# Patient Record
Sex: FEMALE | Race: OTHER | HISPANIC OR LATINO
[De-identification: names, ages, dates, MRNs, and addresses within clinical notes are randomized per-mention and may not be internally consistent; named-entity substitution may affect disease eponyms.]

---

## 2022-11-16 ENCOUNTER — NON-APPOINTMENT (OUTPATIENT)
Age: 42
End: 2022-11-16

## 2022-11-30 ENCOUNTER — APPOINTMENT (OUTPATIENT)
Dept: ANTEPARTUM | Facility: CLINIC | Age: 42
End: 2022-11-30

## 2022-11-30 ENCOUNTER — ASOB RESULT (OUTPATIENT)
Age: 42
End: 2022-11-30

## 2022-11-30 PROCEDURE — 76811 OB US DETAILED SNGL FETUS: CPT

## 2022-11-30 PROCEDURE — 76817 TRANSVAGINAL US OBSTETRIC: CPT

## 2022-12-12 ENCOUNTER — APPOINTMENT (OUTPATIENT)
Dept: ANTEPARTUM | Facility: CLINIC | Age: 42
End: 2022-12-12

## 2022-12-12 ENCOUNTER — ASOB RESULT (OUTPATIENT)
Age: 42
End: 2022-12-12

## 2022-12-12 PROCEDURE — 76946 ECHO GUIDE FOR AMNIOCENTESIS: CPT

## 2022-12-12 PROCEDURE — 59000 AMNIOCENTESIS DIAGNOSTIC: CPT

## 2022-12-29 ENCOUNTER — ASOB RESULT (OUTPATIENT)
Age: 42
End: 2022-12-29

## 2022-12-29 ENCOUNTER — APPOINTMENT (OUTPATIENT)
Dept: ANTEPARTUM | Facility: CLINIC | Age: 42
End: 2022-12-29
Payer: COMMERCIAL

## 2022-12-29 PROCEDURE — 76817 TRANSVAGINAL US OBSTETRIC: CPT | Mod: 59

## 2022-12-29 PROCEDURE — 76816 OB US FOLLOW-UP PER FETUS: CPT

## 2023-01-13 ENCOUNTER — APPOINTMENT (OUTPATIENT)
Dept: ANTEPARTUM | Facility: CLINIC | Age: 43
End: 2023-01-13
Payer: COMMERCIAL

## 2023-01-13 ENCOUNTER — ASOB RESULT (OUTPATIENT)
Age: 43
End: 2023-01-13

## 2023-01-13 PROCEDURE — 76816 OB US FOLLOW-UP PER FETUS: CPT

## 2023-02-10 ENCOUNTER — ASOB RESULT (OUTPATIENT)
Age: 43
End: 2023-02-10

## 2023-02-10 ENCOUNTER — APPOINTMENT (OUTPATIENT)
Dept: ANTEPARTUM | Facility: CLINIC | Age: 43
End: 2023-02-10
Payer: COMMERCIAL

## 2023-02-10 PROCEDURE — 76816 OB US FOLLOW-UP PER FETUS: CPT | Mod: 59

## 2023-02-10 PROCEDURE — 76819 FETAL BIOPHYS PROFIL W/O NST: CPT

## 2023-03-10 ENCOUNTER — ASOB RESULT (OUTPATIENT)
Age: 43
End: 2023-03-10

## 2023-03-10 ENCOUNTER — APPOINTMENT (OUTPATIENT)
Dept: ANTEPARTUM | Facility: CLINIC | Age: 43
End: 2023-03-10
Payer: COMMERCIAL

## 2023-03-10 PROCEDURE — 76816 OB US FOLLOW-UP PER FETUS: CPT

## 2023-03-10 PROCEDURE — 76819 FETAL BIOPHYS PROFIL W/O NST: CPT

## 2023-04-07 ENCOUNTER — APPOINTMENT (OUTPATIENT)
Dept: ANTEPARTUM | Facility: CLINIC | Age: 43
End: 2023-04-07
Payer: COMMERCIAL

## 2023-04-07 ENCOUNTER — ASOB RESULT (OUTPATIENT)
Age: 43
End: 2023-04-07

## 2023-04-07 PROCEDURE — 76818 FETAL BIOPHYS PROFILE W/NST: CPT

## 2023-04-07 PROCEDURE — 76816 OB US FOLLOW-UP PER FETUS: CPT

## 2023-04-13 ENCOUNTER — ASOB RESULT (OUTPATIENT)
Age: 43
End: 2023-04-13

## 2023-04-13 ENCOUNTER — APPOINTMENT (OUTPATIENT)
Dept: ANTEPARTUM | Facility: CLINIC | Age: 43
End: 2023-04-13
Payer: COMMERCIAL

## 2023-04-13 PROCEDURE — 76818 FETAL BIOPHYS PROFILE W/NST: CPT

## 2023-04-20 ENCOUNTER — APPOINTMENT (OUTPATIENT)
Dept: ANTEPARTUM | Facility: CLINIC | Age: 43
End: 2023-04-20
Payer: COMMERCIAL

## 2023-04-20 ENCOUNTER — ASOB RESULT (OUTPATIENT)
Age: 43
End: 2023-04-20

## 2023-04-20 PROCEDURE — 76818 FETAL BIOPHYS PROFILE W/NST: CPT

## 2023-04-20 PROCEDURE — 76816 OB US FOLLOW-UP PER FETUS: CPT

## 2023-04-27 ENCOUNTER — ASOB RESULT (OUTPATIENT)
Age: 43
End: 2023-04-27

## 2023-04-27 ENCOUNTER — APPOINTMENT (OUTPATIENT)
Dept: ANTEPARTUM | Facility: CLINIC | Age: 43
End: 2023-04-27
Payer: COMMERCIAL

## 2023-04-27 PROCEDURE — 76818 FETAL BIOPHYS PROFILE W/NST: CPT

## 2023-05-02 ENCOUNTER — OUTPATIENT (OUTPATIENT)
Dept: OUTPATIENT SERVICES | Facility: HOSPITAL | Age: 43
LOS: 1 days | End: 2023-05-02
Payer: COMMERCIAL

## 2023-05-02 ENCOUNTER — ASOB RESULT (OUTPATIENT)
Age: 43
End: 2023-05-02

## 2023-05-02 ENCOUNTER — APPOINTMENT (OUTPATIENT)
Dept: ANTEPARTUM | Facility: CLINIC | Age: 43
End: 2023-05-02
Payer: COMMERCIAL

## 2023-05-02 DIAGNOSIS — Z01.818 ENCOUNTER FOR OTHER PREPROCEDURAL EXAMINATION: ICD-10-CM

## 2023-05-02 LAB
APTT BLD: 26.7 SEC — LOW (ref 27.5–35.5)
BLD GP AB SCN SERPL QL: NEGATIVE — SIGNIFICANT CHANGE UP
BLD GP AB SCN SERPL QL: NEGATIVE — SIGNIFICANT CHANGE UP
HCT VFR BLD CALC: 41.4 % — SIGNIFICANT CHANGE UP (ref 34.5–45)
HGB BLD-MCNC: 13.8 G/DL — SIGNIFICANT CHANGE UP (ref 11.5–15.5)
INR BLD: 0.91 — SIGNIFICANT CHANGE UP (ref 0.88–1.16)
MCHC RBC-ENTMCNC: 32.4 PG — SIGNIFICANT CHANGE UP (ref 27–34)
MCHC RBC-ENTMCNC: 33.3 GM/DL — SIGNIFICANT CHANGE UP (ref 32–36)
MCV RBC AUTO: 97.2 FL — SIGNIFICANT CHANGE UP (ref 80–100)
NRBC # BLD: 0 /100 WBCS — SIGNIFICANT CHANGE UP (ref 0–0)
PLATELET # BLD AUTO: 252 K/UL — SIGNIFICANT CHANGE UP (ref 150–400)
PROTHROM AB SERPL-ACNC: 10.8 SEC — SIGNIFICANT CHANGE UP (ref 10.5–13.4)
RBC # BLD: 4.26 M/UL — SIGNIFICANT CHANGE UP (ref 3.8–5.2)
RBC # FLD: 13.6 % — SIGNIFICANT CHANGE UP (ref 10.3–14.5)
RH IG SCN BLD-IMP: POSITIVE — SIGNIFICANT CHANGE UP
RH IG SCN BLD-IMP: POSITIVE — SIGNIFICANT CHANGE UP
WBC # BLD: 9.37 K/UL — SIGNIFICANT CHANGE UP (ref 3.8–10.5)
WBC # FLD AUTO: 9.37 K/UL — SIGNIFICANT CHANGE UP (ref 3.8–10.5)

## 2023-05-02 PROCEDURE — 85027 COMPLETE CBC AUTOMATED: CPT

## 2023-05-02 PROCEDURE — 86850 RBC ANTIBODY SCREEN: CPT

## 2023-05-02 PROCEDURE — 85610 PROTHROMBIN TIME: CPT

## 2023-05-02 PROCEDURE — 86780 TREPONEMA PALLIDUM: CPT

## 2023-05-02 PROCEDURE — 76816 OB US FOLLOW-UP PER FETUS: CPT

## 2023-05-02 PROCEDURE — 86901 BLOOD TYPING SEROLOGIC RH(D): CPT

## 2023-05-02 PROCEDURE — 85730 THROMBOPLASTIN TIME PARTIAL: CPT

## 2023-05-02 PROCEDURE — 86900 BLOOD TYPING SEROLOGIC ABO: CPT

## 2023-05-02 PROCEDURE — 76818 FETAL BIOPHYS PROFILE W/NST: CPT

## 2023-05-03 ENCOUNTER — TRANSCRIPTION ENCOUNTER (OUTPATIENT)
Age: 43
End: 2023-05-03

## 2023-05-03 LAB — T PALLIDUM AB TITR SER: NEGATIVE — SIGNIFICANT CHANGE UP

## 2023-05-04 ENCOUNTER — INPATIENT (INPATIENT)
Facility: HOSPITAL | Age: 43
LOS: 2 days | Discharge: HOME | End: 2023-05-07
Attending: OBSTETRICS & GYNECOLOGY | Admitting: OBSTETRICS & GYNECOLOGY
Payer: COMMERCIAL

## 2023-05-04 VITALS — HEIGHT: 67 IN | WEIGHT: 164.91 LBS

## 2023-05-04 LAB
BLD GP AB SCN SERPL QL: NEGATIVE — SIGNIFICANT CHANGE UP
RH IG SCN BLD-IMP: POSITIVE — SIGNIFICANT CHANGE UP
RUBV IGG SER-ACNC: 8.1 INDEX — SIGNIFICANT CHANGE UP
RUBV IGG SER-IMP: POSITIVE — SIGNIFICANT CHANGE UP

## 2023-05-04 PROCEDURE — 88307 TISSUE EXAM BY PATHOLOGIST: CPT | Mod: 26

## 2023-05-04 PROCEDURE — 37244 VASC EMBOLIZE/OCCLUDE BLEED: CPT

## 2023-05-04 PROCEDURE — 36247 INS CATH ABD/L-EXT ART 3RD: CPT | Mod: 59

## 2023-05-04 DEVICE — ARISTA 3GR: Type: IMPLANTABLE DEVICE | Status: FUNCTIONAL

## 2023-05-04 DEVICE — SEPRAFILM 5 X 6": Type: IMPLANTABLE DEVICE | Status: FUNCTIONAL

## 2023-05-04 RX ORDER — SIMETHICONE 80 MG/1
80 TABLET, CHEWABLE ORAL EVERY 4 HOURS
Refills: 0 | Status: DISCONTINUED | OUTPATIENT
Start: 2023-05-04 | End: 2023-05-07

## 2023-05-04 RX ORDER — SODIUM CHLORIDE 9 MG/ML
1000 INJECTION, SOLUTION INTRAVENOUS
Refills: 0 | Status: DISCONTINUED | OUTPATIENT
Start: 2023-05-04 | End: 2023-05-04

## 2023-05-04 RX ORDER — CEFAZOLIN SODIUM 1 G
2000 VIAL (EA) INJECTION EVERY 8 HOURS
Refills: 0 | Status: COMPLETED | OUTPATIENT
Start: 2023-05-04 | End: 2023-05-05

## 2023-05-04 RX ORDER — LANOLIN
1 OINTMENT (GRAM) TOPICAL EVERY 6 HOURS
Refills: 0 | Status: DISCONTINUED | OUTPATIENT
Start: 2023-05-04 | End: 2023-05-07

## 2023-05-04 RX ORDER — SODIUM CHLORIDE 9 MG/ML
1000 INJECTION, SOLUTION INTRAVENOUS ONCE
Refills: 0 | Status: COMPLETED | OUTPATIENT
Start: 2023-05-04 | End: 2023-05-04

## 2023-05-04 RX ORDER — FAMOTIDINE 10 MG/ML
20 INJECTION INTRAVENOUS ONCE
Refills: 0 | Status: COMPLETED | OUTPATIENT
Start: 2023-05-04 | End: 2023-05-04

## 2023-05-04 RX ORDER — OXYTOCIN 10 UNIT/ML
333.33 VIAL (ML) INJECTION
Qty: 20 | Refills: 0 | Status: DISCONTINUED | OUTPATIENT
Start: 2023-05-04 | End: 2023-05-04

## 2023-05-04 RX ORDER — CITRIC ACID/SODIUM CITRATE 300-500 MG
30 SOLUTION, ORAL ORAL ONCE
Refills: 0 | Status: DISCONTINUED | OUTPATIENT
Start: 2023-05-04 | End: 2023-05-04

## 2023-05-04 RX ORDER — KETOROLAC TROMETHAMINE 30 MG/ML
30 SYRINGE (ML) INJECTION EVERY 6 HOURS
Refills: 0 | Status: DISCONTINUED | OUTPATIENT
Start: 2023-05-04 | End: 2023-05-05

## 2023-05-04 RX ORDER — OXYCODONE HYDROCHLORIDE 5 MG/1
5 TABLET ORAL
Refills: 0 | Status: DISCONTINUED | OUTPATIENT
Start: 2023-05-04 | End: 2023-05-07

## 2023-05-04 RX ORDER — DIPHENHYDRAMINE HCL 50 MG
25 CAPSULE ORAL EVERY 6 HOURS
Refills: 0 | Status: DISCONTINUED | OUTPATIENT
Start: 2023-05-04 | End: 2023-05-07

## 2023-05-04 RX ORDER — ACETAMINOPHEN 500 MG
975 TABLET ORAL
Refills: 0 | Status: DISCONTINUED | OUTPATIENT
Start: 2023-05-04 | End: 2023-05-07

## 2023-05-04 RX ORDER — CEFAZOLIN SODIUM 1 G
2000 VIAL (EA) INJECTION ONCE
Refills: 0 | Status: COMPLETED | OUTPATIENT
Start: 2023-05-04 | End: 2023-05-04

## 2023-05-04 RX ORDER — IBUPROFEN 200 MG
600 TABLET ORAL EVERY 6 HOURS
Refills: 0 | Status: COMPLETED | OUTPATIENT
Start: 2023-05-04 | End: 2024-04-01

## 2023-05-04 RX ORDER — TETANUS TOXOID, REDUCED DIPHTHERIA TOXOID AND ACELLULAR PERTUSSIS VACCINE, ADSORBED 5; 2.5; 8; 8; 2.5 [IU]/.5ML; [IU]/.5ML; UG/.5ML; UG/.5ML; UG/.5ML
0.5 SUSPENSION INTRAMUSCULAR ONCE
Refills: 0 | Status: DISCONTINUED | OUTPATIENT
Start: 2023-05-04 | End: 2023-05-07

## 2023-05-04 RX ORDER — OXYTOCIN 10 UNIT/ML
333.33 VIAL (ML) INJECTION
Qty: 20 | Refills: 0 | Status: DISCONTINUED | OUTPATIENT
Start: 2023-05-04 | End: 2023-05-07

## 2023-05-04 RX ORDER — SODIUM CHLORIDE 9 MG/ML
1000 INJECTION, SOLUTION INTRAVENOUS
Refills: 0 | Status: DISCONTINUED | OUTPATIENT
Start: 2023-05-04 | End: 2023-05-07

## 2023-05-04 RX ORDER — DIPHENOXYLATE HCL/ATROPINE 2.5-.025MG
1 TABLET ORAL ONCE
Refills: 0 | Status: DISCONTINUED | OUTPATIENT
Start: 2023-05-04 | End: 2023-05-04

## 2023-05-04 RX ORDER — MAGNESIUM HYDROXIDE 400 MG/1
30 TABLET, CHEWABLE ORAL
Refills: 0 | Status: DISCONTINUED | OUTPATIENT
Start: 2023-05-04 | End: 2023-05-07

## 2023-05-04 RX ORDER — OXYCODONE HYDROCHLORIDE 5 MG/1
5 TABLET ORAL ONCE
Refills: 0 | Status: DISCONTINUED | OUTPATIENT
Start: 2023-05-04 | End: 2023-05-07

## 2023-05-04 RX ADMIN — Medication 0.2 MILLIGRAM(S): at 18:29

## 2023-05-04 RX ADMIN — Medication 975 MILLIGRAM(S): at 22:00

## 2023-05-04 RX ADMIN — SODIUM CHLORIDE 125 MILLILITER(S): 9 INJECTION, SOLUTION INTRAVENOUS at 09:30

## 2023-05-04 RX ADMIN — Medication 100 MILLIGRAM(S): at 19:04

## 2023-05-04 RX ADMIN — Medication 30 MILLIGRAM(S): at 19:04

## 2023-05-04 RX ADMIN — SODIUM CHLORIDE 2000 MILLILITER(S): 9 INJECTION, SOLUTION INTRAVENOUS at 10:36

## 2023-05-04 RX ADMIN — Medication 30 MILLIGRAM(S): at 20:08

## 2023-05-04 RX ADMIN — Medication 100 MILLIGRAM(S): at 11:30

## 2023-05-04 RX ADMIN — Medication 975 MILLIGRAM(S): at 22:30

## 2023-05-04 RX ADMIN — FAMOTIDINE 20 MILLIGRAM(S): 10 INJECTION INTRAVENOUS at 10:36

## 2023-05-04 NOTE — PRE-ANESTHESIA EVALUATION ADULT - NSANTHPMHFT_GEN_ALL_CORE
PMHx; denies    PSxHx myomectomy GA no complication    OB Hx  with IUP 39 weeks, Breech Presentation, +HPV

## 2023-05-04 NOTE — PRE-ANESTHESIA EVALUATION ADULT - NSANTHOSAYNRD_GEN_A_CORE
No. SHEYLA screening performed.  STOP BANG Legend: 0-2 = LOW Risk; 3-4 = INTERMEDIATE Risk; 5-8 = HIGH Risk

## 2023-05-04 NOTE — CONSULT NOTE ADULT - SUBJECTIVE AND OBJECTIVE BOX
42F presented to [facility] for elective c section for breech presentation c/b placenta accreta, EBV 1500 now s/p 2u PRCB. IR consulted for uterine artery embolization.     Clinical History: O32.1XX0    Handoff    SysAdmin_VstLnk        Meds:acetaminophen     Tablet .. 975 milliGRAM(s) Oral <User Schedule>  diphenhydrAMINE 25 milliGRAM(s) Oral every 6 hours PRN  diphtheria/tetanus/pertussis (acellular) Vaccine (Adacel) 0.5 milliLiter(s) IntraMuscular once  ibuprofen  Tablet. 600 milliGRAM(s) Oral every 6 hours  ketorolac   Injectable 30 milliGRAM(s) IV Push every 6 hours  lactated ringers. 1000 milliLiter(s) IV Continuous <Continuous>  lanolin Ointment 1 Application(s) Topical every 6 hours PRN  magnesium hydroxide Suspension 30 milliLiter(s) Oral two times a day PRN  oxyCODONE    IR 5 milliGRAM(s) Oral every 3 hours PRN  oxyCODONE    IR 5 milliGRAM(s) Oral once PRN  oxytocin Infusion 333.333 milliUNIT(s)/Min IV Continuous <Continuous>  simethicone 80 milliGRAM(s) Chew every 4 hours PRN      Allergies:No Known Allergies        Labs:

## 2023-05-04 NOTE — CONSULT NOTE ADULT - ASSESSMENT
Assessment: 42F presented to [facility] for elective c section for breech presentation c/b placenta accreta, EBV 1500 now s/p 2u PRCB. IR consulted for uterine artery embolization. Case reviewed with Dr. Burger, plan for procedure with anesthesia.     Recommendations: Maintain NPO x 8 hours prior to procedure.     Communicated with: Dr. King, Dr. Ogden

## 2023-05-05 LAB
ANISOCYTOSIS BLD QL: SLIGHT — SIGNIFICANT CHANGE UP
BASOPHILS # BLD AUTO: 0 K/UL — SIGNIFICANT CHANGE UP (ref 0–0.2)
BASOPHILS NFR BLD AUTO: 0 % — SIGNIFICANT CHANGE UP (ref 0–2)
EOSINOPHIL # BLD AUTO: 0 K/UL — SIGNIFICANT CHANGE UP (ref 0–0.5)
EOSINOPHIL NFR BLD AUTO: 0 % — SIGNIFICANT CHANGE UP (ref 0–6)
GIANT PLATELETS BLD QL SMEAR: PRESENT — SIGNIFICANT CHANGE UP
HCT VFR BLD CALC: 39.6 % — SIGNIFICANT CHANGE UP (ref 34.5–45)
HGB BLD-MCNC: 13.5 G/DL — SIGNIFICANT CHANGE UP (ref 11.5–15.5)
LYMPHOCYTES # BLD AUTO: 1.71 K/UL — SIGNIFICANT CHANGE UP (ref 1–3.3)
LYMPHOCYTES # BLD AUTO: 11.3 % — LOW (ref 13–44)
MANUAL SMEAR VERIFICATION: SIGNIFICANT CHANGE UP
MCHC RBC-ENTMCNC: 32 PG — SIGNIFICANT CHANGE UP (ref 27–34)
MCHC RBC-ENTMCNC: 34.1 GM/DL — SIGNIFICANT CHANGE UP (ref 32–36)
MCV RBC AUTO: 93.8 FL — SIGNIFICANT CHANGE UP (ref 80–100)
MONOCYTES # BLD AUTO: 1.46 K/UL — HIGH (ref 0–0.9)
MONOCYTES NFR BLD AUTO: 9.6 % — SIGNIFICANT CHANGE UP (ref 2–14)
NEUTROPHILS # BLD AUTO: 11.6 K/UL — HIGH (ref 1.8–7.4)
NEUTROPHILS NFR BLD AUTO: 74.8 % — SIGNIFICANT CHANGE UP (ref 43–77)
NEUTS BAND # BLD: 1.7 % — SIGNIFICANT CHANGE UP (ref 0–8)
OVALOCYTES BLD QL SMEAR: SLIGHT — SIGNIFICANT CHANGE UP
PLAT MORPH BLD: ABNORMAL
PLATELET # BLD AUTO: 193 K/UL — SIGNIFICANT CHANGE UP (ref 150–400)
POIKILOCYTOSIS BLD QL AUTO: SLIGHT — SIGNIFICANT CHANGE UP
POLYCHROMASIA BLD QL SMEAR: SLIGHT — SIGNIFICANT CHANGE UP
RBC # BLD: 4.22 M/UL — SIGNIFICANT CHANGE UP (ref 3.8–5.2)
RBC # FLD: 14.6 % — HIGH (ref 10.3–14.5)
RBC BLD AUTO: ABNORMAL
VARIANT LYMPHS # BLD: 2.6 % — SIGNIFICANT CHANGE UP (ref 0–6)
WBC # BLD: 15.16 K/UL — HIGH (ref 3.8–10.5)
WBC # FLD AUTO: 15.16 K/UL — HIGH (ref 3.8–10.5)

## 2023-05-05 RX ORDER — IBUPROFEN 200 MG
600 TABLET ORAL EVERY 6 HOURS
Refills: 0 | Status: DISCONTINUED | OUTPATIENT
Start: 2023-05-05 | End: 2023-05-07

## 2023-05-05 RX ORDER — FAMOTIDINE 10 MG/ML
20 INJECTION INTRAVENOUS ONCE
Refills: 0 | Status: COMPLETED | OUTPATIENT
Start: 2023-05-05 | End: 2023-05-05

## 2023-05-05 RX ORDER — ENOXAPARIN SODIUM 100 MG/ML
40 INJECTION SUBCUTANEOUS EVERY 24 HOURS
Refills: 0 | Status: DISCONTINUED | OUTPATIENT
Start: 2023-05-05 | End: 2023-05-07

## 2023-05-05 RX ADMIN — Medication 975 MILLIGRAM(S): at 21:01

## 2023-05-05 RX ADMIN — Medication 975 MILLIGRAM(S): at 09:39

## 2023-05-05 RX ADMIN — Medication 30 MILLIGRAM(S): at 12:04

## 2023-05-05 RX ADMIN — Medication 0.2 MILLIGRAM(S): at 07:23

## 2023-05-05 RX ADMIN — Medication 975 MILLIGRAM(S): at 22:01

## 2023-05-05 RX ADMIN — Medication 975 MILLIGRAM(S): at 10:00

## 2023-05-05 RX ADMIN — ENOXAPARIN SODIUM 40 MILLIGRAM(S): 100 INJECTION SUBCUTANEOUS at 18:00

## 2023-05-05 RX ADMIN — Medication 975 MILLIGRAM(S): at 16:04

## 2023-05-05 RX ADMIN — Medication 100 MILLIGRAM(S): at 11:12

## 2023-05-05 RX ADMIN — FAMOTIDINE 20 MILLIGRAM(S): 10 INJECTION INTRAVENOUS at 11:11

## 2023-05-05 RX ADMIN — Medication 30 MILLIGRAM(S): at 06:22

## 2023-05-05 RX ADMIN — Medication 30 MILLIGRAM(S): at 13:00

## 2023-05-05 RX ADMIN — Medication 0.2 MILLIGRAM(S): at 00:40

## 2023-05-05 RX ADMIN — Medication 975 MILLIGRAM(S): at 15:41

## 2023-05-05 RX ADMIN — Medication 975 MILLIGRAM(S): at 03:31

## 2023-05-05 RX ADMIN — Medication 30 MILLIGRAM(S): at 00:40

## 2023-05-05 RX ADMIN — Medication 975 MILLIGRAM(S): at 03:43

## 2023-05-05 RX ADMIN — Medication 100 MILLIGRAM(S): at 03:30

## 2023-05-05 RX ADMIN — Medication 600 MILLIGRAM(S): at 18:17

## 2023-05-05 RX ADMIN — Medication 0.2 MILLIGRAM(S): at 11:11

## 2023-05-05 RX ADMIN — Medication 600 MILLIGRAM(S): at 18:20

## 2023-05-05 RX ADMIN — Medication 30 MILLIGRAM(S): at 00:38

## 2023-05-05 RX ADMIN — Medication 30 MILLIGRAM(S): at 07:23

## 2023-05-05 NOTE — LACTATION INITIAL EVALUATION - NS LACT CON REASON FOR REQ
39.1 weeks baby boy. Seen at 22 hours of life. Sleepy and was bottle feeding only since birth. Mother  has bilateral breast implants 12 years ago. Nipple sensation intact. Baby was able to latch briefly for less thab 5 minutes before falling asleep in cradle and football position. Hand expression demonstrated with no colostrum noted. Mother plans to breastfeed exclusively at home, but agree to start triple feeding plan now. Handout given, explained and demonstrated to mother and father. All questions answered. Plan related to primary bedside nurse. Tele lactation consult placed to f/u upon discharge./primaparous mom

## 2023-05-05 NOTE — LACTATION INITIAL EVALUATION - LACTATION INTERVENTIONS
Triple feeding plan./initiate/review safe skin-to-skin/initiate/review hand expression/initiate/review techniques for position and latch/post discharge community resources provided/reviewed components of an effective feeding and at least 8 effective feedings per day required/reviewed importance of monitoring infant diapers, the breastfeeding log, and minimum output each day/reviewed benefits and recommendations for rooming in/reviewed feeding on demand/by cue at least 8 times a day

## 2023-05-06 RX ADMIN — Medication 600 MILLIGRAM(S): at 18:57

## 2023-05-06 RX ADMIN — Medication 975 MILLIGRAM(S): at 03:00

## 2023-05-06 RX ADMIN — Medication 975 MILLIGRAM(S): at 09:51

## 2023-05-06 RX ADMIN — ENOXAPARIN SODIUM 40 MILLIGRAM(S): 100 INJECTION SUBCUTANEOUS at 18:27

## 2023-05-06 RX ADMIN — Medication 600 MILLIGRAM(S): at 06:47

## 2023-05-06 RX ADMIN — Medication 600 MILLIGRAM(S): at 06:00

## 2023-05-06 RX ADMIN — Medication 975 MILLIGRAM(S): at 15:42

## 2023-05-06 RX ADMIN — Medication 600 MILLIGRAM(S): at 13:00

## 2023-05-06 RX ADMIN — Medication 975 MILLIGRAM(S): at 16:13

## 2023-05-06 RX ADMIN — Medication 600 MILLIGRAM(S): at 18:29

## 2023-05-06 RX ADMIN — Medication 600 MILLIGRAM(S): at 00:22

## 2023-05-06 RX ADMIN — Medication 975 MILLIGRAM(S): at 10:36

## 2023-05-06 RX ADMIN — Medication 600 MILLIGRAM(S): at 12:20

## 2023-05-06 RX ADMIN — Medication 975 MILLIGRAM(S): at 20:28

## 2023-05-06 RX ADMIN — Medication 975 MILLIGRAM(S): at 21:00

## 2023-05-06 RX ADMIN — Medication 975 MILLIGRAM(S): at 03:47

## 2023-05-06 RX ADMIN — Medication 600 MILLIGRAM(S): at 01:22

## 2023-05-07 ENCOUNTER — TRANSCRIPTION ENCOUNTER (OUTPATIENT)
Age: 43
End: 2023-05-07

## 2023-05-07 VITALS
TEMPERATURE: 98 F | SYSTOLIC BLOOD PRESSURE: 128 MMHG | RESPIRATION RATE: 18 BRPM | DIASTOLIC BLOOD PRESSURE: 74 MMHG | HEART RATE: 96 BPM | OXYGEN SATURATION: 98 %

## 2023-05-07 PROCEDURE — 86762 RUBELLA ANTIBODY: CPT

## 2023-05-07 PROCEDURE — 88307 TISSUE EXAM BY PATHOLOGIST: CPT

## 2023-05-07 PROCEDURE — 86900 BLOOD TYPING SEROLOGIC ABO: CPT

## 2023-05-07 PROCEDURE — 86923 COMPATIBILITY TEST ELECTRIC: CPT

## 2023-05-07 PROCEDURE — P9016: CPT

## 2023-05-07 PROCEDURE — C1760: CPT

## 2023-05-07 PROCEDURE — C1889: CPT

## 2023-05-07 PROCEDURE — 86901 BLOOD TYPING SEROLOGIC RH(D): CPT

## 2023-05-07 PROCEDURE — 85025 COMPLETE CBC W/AUTO DIFF WBC: CPT

## 2023-05-07 PROCEDURE — 59050 FETAL MONITOR W/REPORT: CPT

## 2023-05-07 PROCEDURE — 36430 TRANSFUSION BLD/BLD COMPNT: CPT

## 2023-05-07 PROCEDURE — 86850 RBC ANTIBODY SCREEN: CPT

## 2023-05-07 PROCEDURE — C1894: CPT

## 2023-05-07 PROCEDURE — C1769: CPT

## 2023-05-07 PROCEDURE — 37244 VASC EMBOLIZE/OCCLUDE BLEED: CPT

## 2023-05-07 PROCEDURE — 36415 COLL VENOUS BLD VENIPUNCTURE: CPT

## 2023-05-07 PROCEDURE — C1887: CPT

## 2023-05-07 PROCEDURE — 36247 INS CATH ABD/L-EXT ART 3RD: CPT

## 2023-05-07 PROCEDURE — C1765: CPT

## 2023-05-07 RX ORDER — ACETAMINOPHEN 500 MG
3 TABLET ORAL
Qty: 0 | Refills: 0 | DISCHARGE
Start: 2023-05-07

## 2023-05-07 RX ORDER — IBUPROFEN 200 MG
1 TABLET ORAL
Qty: 0 | Refills: 0 | DISCHARGE
Start: 2023-05-07

## 2023-05-07 RX ADMIN — Medication 600 MILLIGRAM(S): at 06:57

## 2023-05-07 RX ADMIN — Medication 600 MILLIGRAM(S): at 00:11

## 2023-05-07 RX ADMIN — Medication 600 MILLIGRAM(S): at 06:20

## 2023-05-07 RX ADMIN — Medication 600 MILLIGRAM(S): at 12:00

## 2023-05-07 RX ADMIN — Medication 600 MILLIGRAM(S): at 00:45

## 2023-05-07 NOTE — DISCHARGE NOTE OB - HOSPITAL COURSE
Pt is a 42yF s/p c-sx c/b PPH (atony, possible accreta and uterine inversion).  Please see delivery note for details - s/p 2u prbcs, TXA, cytotec, methergine and methergine series, UAE 5/4, and B carson x 2. She received ancef for substerility and blood loss. Pt hbg stabilzed at 13.5 and pt has been asymptomatic.  During postpartum course patient's, vaginal bleeding appropriate, and pain well controlled.  On day of discharge patient was ambulating, pt had adequate oral intake, and was voiding freely.  Discharge instructions and precautions were given.  Will return to clinic in 1-2 weeks for incision check.

## 2023-05-07 NOTE — DISCHARGE NOTE OB - CARE PROVIDER_API CALL
Yessy Holt  OBSTETRICS AND GYNECOLOGY  [address] [place] [address]  Phone: [phone]  Fax: [phone]  Follow Up Time:

## 2023-05-07 NOTE — DISCHARGE NOTE OB - CARE PLAN
1 Principal Discharge DX:	Postpartum state  Assessment and plan of treatment:	Please follow-up with your OB doctor within 1-2 weeks for an incision check. You can resume a regular diet at home and you should continue your prenatal vitamins as directed. You can take Tylenol 650mg every 6 hours for pain alternating with Motrin 600mg every 6 hours if needed.     Please place nothing in the vagina for 6 weeks (no tampons, no sex, no douching, no baths, no hot tubs, no swimming pools, etc). If you have severe headaches and/or vision changes, heavy bleeding, chest pain, or shortness of breath, please call your provider or go to the nearest ED. Call your OB with any signs of symptoms of infection including fever > 100.4 degrees, severe pain, malodorous vaginal discharge or heavy bleeding requiring more than 1-2 pads/hour.  Secondary Diagnosis:	Postpartum hemorrhage  Assessment and plan of treatment:	You had a postpartum hemorrhage during your  section due to atony and possible placenta accreta along with a uterine inversion. you received uterotonics, 2units of blood and a uterine artery embolization and then recovered well. You hemoglobin is stable now.

## 2023-05-07 NOTE — PROGRESS NOTE ADULT - ASSESSMENT
A/P: 42y s/p  section, POD#3 c/b PPH due to atony with possible accretta , stable   - PPH: pt is s/p double pit, hemabate x 2, Methergine x1, TXA, cytotec, BLynch x 12 at the time of delivery, she is hemodynamically stable and s/p UAE on /.S/p methergine series, bleeding is well controlled at this time and with normal vitals  -  Pain: PO motrin q6hrs, tylenol q8hrs, oxycodone for severe pain PRN  -  Post-operatively labs: post-op Hgb , hemodynamically stable, no symptoms of anemia   -  GI: tolerating regular diet, passing gas  -  : s/p pollock , urinating without difficulty  -  DVT prophylaxis: encouraged increased ambulation, SCDs, Lovenox  -  Dispo: POD 3 or 4
A/P: 42y s/p  section, POD#2 c/b PPH due to atony with possible accretta , stable   - PPH: pt is s/p double pit, hemabate x 2, Methergine x1, TXA, cytotec, BLynch x 12 at the time of delivery, she is hemodynamically stable and s/p UAE on . Currently on PO methergine series, bleeding is well controlled at this time and with normal vitals  -  Pain: PO motrin q6hrs, tylenol q8hrs, oxycodone for severe pain PRN  -  Post-operatively labs: post-op Hgb , hemodynamically stable, no symptoms of anemia   -  GI: tolerating regular diet, passing gas  -  : s/p pollock , urinating without difficulty  -  DVT prophylaxis: encouraged increased ambulation, SCDs, Lovenox  -  Dispo: POD 3 or 4

## 2023-05-07 NOTE — PROGRESS NOTE ADULT - SUBJECTIVE AND OBJECTIVE BOX
Patient evaluated at bedside. No acute events overnight. She reports pain is well controlled with OPM. Adequate urine output.  She denies headache, dizziness, chest pain, palpitations, shortness of breath, nausea, vomiting or heavy vaginal bleeding. Positive flatus      Physical Exam:  Vital Signs Last 24 Hrs  T(C): 36.7 (06 May 2023 06:00), Max: 37 (05 May 2023 22:00)  T(F): 98.1 (06 May 2023 06:00), Max: 98.6 (05 May 2023 22:00)  HR: 80 (06 May 2023 06:00) (67 - 84)  BP: 112/64 (06 May 2023 06:00) (108/67 - 123/85)  BP(mean): 82 (05 May 2023 14:00) (82 - 82)  RR: 18 (06 May 2023 06:00) (18 - 18)  SpO2: 97% (06 May 2023 06:00) (97% - 99%)    Parameters below as of 05 May 2023 14:00  Patient On (Oxygen Delivery Method): room air        GA: NAD, A+0 x 3  Abd: + BS, soft, nontender, nondistended, no rebound or guarding, uterus firm at midline, 2 fb below umbilicus  Incision clean, dry and intact  : lochia WNL  Extremities: no swelling or calf tenderness                            13.5   15.16 )-----------( 193      ( 05 May 2023 05:30 )             39.6       A/P  yo 42y s/p c/s  complicated by placenta accreta, POD #2  ,stable  s/p UAE  Diet: regular  Pain: OPM  OOB/SCDs/IS  Continue to monitor  Anticipate discharge POD #3 or #4        
pt seen and examined  doing well on oral pain meds  pain well controlled  vssaf  incision c/d/i  meeting milestones  will ck labs        
Patient evaluated at bedside. No acute events overnight. She reports pain is well controlled with OPM. Adequate urine output.  She denies headache, dizziness, chest pain, palpitations, shortness of breath, nausea, vomiting or heavy vaginal bleeding.      Physical Exam:  Vital Signs Last 24 Hrs  T(C): 36.8 (07 May 2023 06:00), Max: 37.2 (06 May 2023 14:20)  T(F): 98.2 (07 May 2023 06:00), Max: 99 (06 May 2023 14:20)  HR: 74 (07 May 2023 06:00) (74 - 100)  BP: 121/75 (07 May 2023 06:00) (111/67 - 122/85)  BP(mean): 90 (07 May 2023 06:00) (90 - 90)  RR: 18 (07 May 2023 06:00) (16 - 20)  SpO2: 98% (07 May 2023 06:00) (96% - 99%)    Parameters below as of 07 May 2023 06:00  Patient On (Oxygen Delivery Method): room air        GA: NAD, A+0 x 3  Abd: + BS, soft, nontender, nondistended, no rebound or guarding, uterus firm at midline, 2 fb below umbilicus  Incision clean, dry and intact  : lochia WNL  Extremities: no calf tenderness    A/P  yo 42y s/p c/s complicated by PPH,  POD #3  ,stable  Diet: regular  Pain: OPM  OOB/SCDs/IS  Continue to monitor  Anticipate discharge, precautions reviewed              
Patient evaluated at bedside this morning, resting comfortable in bed.   She reports pain is well controlled.  She denies headache, dizziness, chest pain, palpitations, shortness of breathe, nausea, vomiting or heavy vaginal bleeding.  She has been ambulating without assistance, voiding spontaneously, passing gas, tolerating regular diet and is breastfeeding.    Physical Exam:  Vital Signs Last 24 Hrs  T(C): 36.7 (06 May 2023 06:00), Max: 37 (05 May 2023 22:00)  T(F): 98.1 (06 May 2023 06:00), Max: 98.6 (05 May 2023 22:00)  HR: 80 (06 May 2023 06:00) (67 - 84)  BP: 112/64 (06 May 2023 06:00) (108/67 - 123/85)  BP(mean): 82 (05 May 2023 14:00) (82 - 82)  RR: 18 (06 May 2023 06:00) (18 - 18)  SpO2: 97% (06 May 2023 06:00) (97% - 99%)    Parameters below as of 05 May 2023 14:00  Patient On (Oxygen Delivery Method): room air        GA: NAD, A+0 x 3  Abd: + BS, soft, nontender, nondistended, no rebound or guarding, incision clean, dry and intact, uterus firm at midline and below umbilicus  : lochia WNL  Extremities: no swelling or calf tenderness                             13.5   15.16 )-----------( 193      ( 05 May 2023 05:30 )             39.6               
Patient evaluated at bedside this morning, resting comfortable in bed.   She reports pain is well controlled.  She denies headache, dizziness, chest pain, palpitations, shortness of breathe, nausea, vomiting or heavy vaginal bleeding.  She has been ambulating without assistance, voiding spontaneously, passing gas, tolerating regular diet and is breastfeeding.    Physical Exam:  Vital Signs Last 24 Hrs  T(C): 36.8 (07 May 2023 06:00), Max: 37.2 (06 May 2023 14:20)  T(F): 98.2 (07 May 2023 06:00), Max: 99 (06 May 2023 14:20)  HR: 74 (07 May 2023 06:00) (74 - 100)  BP: 121/75 (07 May 2023 06:00) (111/67 - 122/85)  BP(mean): 90 (07 May 2023 06:00) (90 - 90)  RR: 18 (07 May 2023 06:00) (16 - 20)  SpO2: 98% (07 May 2023 06:00) (96% - 99%)    Parameters below as of 07 May 2023 06:00  Patient On (Oxygen Delivery Method): room air        GA: NAD, A+0 x 3  Abd: + BS, soft, nontender, nondistended, no rebound or guarding, incision clean, dry and intact, uterus firm at midline and below umbilicus  : lochia WNL  Extremities: no swelling or calf tenderness

## 2023-05-07 NOTE — DISCHARGE NOTE OB - PLAN OF CARE
Please follow-up with your OB doctor within 1-2 weeks for an incision check. You can resume a regular diet at home and you should continue your prenatal vitamins as directed. You can take Tylenol 650mg every 6 hours for pain alternating with Motrin 600mg every 6 hours if needed.     Please place nothing in the vagina for 6 weeks (no tampons, no sex, no douching, no baths, no hot tubs, no swimming pools, etc). If you have severe headaches and/or vision changes, heavy bleeding, chest pain, or shortness of breath, please call your provider or go to the nearest ED. Call your OB with any signs of symptoms of infection including fever > 100.4 degrees, severe pain, malodorous vaginal discharge or heavy bleeding requiring more than 1-2 pads/hour. You had a postpartum hemorrhage during your  section due to atony and possible placenta accreta along with a uterine inversion. you received uterotonics, 2units of blood and a uterine artery embolization and then recovered well. You hemoglobin is stable now.

## 2023-05-07 NOTE — DISCHARGE NOTE OB - PATIENT PORTAL LINK FT
You can access the FollowMyHealth Patient Portal offered by [facility] by registering at the following website: [email or web address]. By joining [facility]’s FollowMyHealth portal, you will also be able to view your health information using other applications (apps) compatible with our system.

## 2023-05-10 DIAGNOSIS — Z3A.39 39 WEEKS GESTATION OF PREGNANCY: ICD-10-CM

## 2023-05-10 DIAGNOSIS — Z28.09 IMMUNIZATION NOT CARRIED OUT BECAUSE OF OTHER CONTRAINDICATION: ICD-10-CM

## 2023-05-10 DIAGNOSIS — O43.213 PLACENTA ACCRETA, THIRD TRIMESTER: ICD-10-CM

## 2023-05-10 DIAGNOSIS — Z34.83 ENCOUNTER FOR SUPERVISION OF OTHER NORMAL PREGNANCY, THIRD TRIMESTER: ICD-10-CM

## 2023-05-12 PROBLEM — Z00.00 ENCOUNTER FOR PREVENTIVE HEALTH EXAMINATION: Status: ACTIVE | Noted: 2023-05-12

## 2023-05-24 LAB — SURGICAL PATHOLOGY STUDY: SIGNIFICANT CHANGE UP
